# Patient Record
Sex: FEMALE | Race: WHITE
[De-identification: names, ages, dates, MRNs, and addresses within clinical notes are randomized per-mention and may not be internally consistent; named-entity substitution may affect disease eponyms.]

---

## 2021-04-22 ENCOUNTER — HOSPITAL ENCOUNTER (OUTPATIENT)
Dept: HOSPITAL 63 - MAMMO | Age: 76
End: 2021-04-22
Attending: FAMILY MEDICINE
Payer: MEDICARE

## 2021-04-22 DIAGNOSIS — Z12.31: Primary | ICD-10-CM

## 2021-04-22 PROCEDURE — 77063 BREAST TOMOSYNTHESIS BI: CPT

## 2021-04-22 PROCEDURE — 77067 SCR MAMMO BI INCL CAD: CPT

## 2021-04-27 NOTE — RAD
PROCEDURE:  MG BILAT SCREEN+MARY



HISTORY: The patient is 76 years old and is seen for Reason: SCREENING / Spl. Instructions:  / Histor
y: .



COMPARISON: May 29, 2018 and September 5, 2019 



TECHNIQUE: CC and MLO views of both breasts were obtained.  Images were processed by the [employer]
 computer-aided detection system.



DENSITY: The breast parenchyma is heterogeneously dense. This may lower the sensitivity of mammograph
y.



FINDINGS:   No developing mass, suspicious calcifications or architectural distortion.  Right inferio
r breast asymmetry on MLO view, unchanged compared to 2018.



IMPRESSION: Negative. No evidence of malignancy.



 Recommend annual screening mammograms per [employer] guidelines. She will be due in one
 year.



 



BI-RADS category 2  

Benign



Patient entered into a reminder system for annual screening mammogram.





Electronically signed by: Donald Vega DO (4/27/2021 4:31 PM) [id number]

## 2022-04-09 ENCOUNTER — HOSPITAL ENCOUNTER (EMERGENCY)
Dept: HOSPITAL 63 - ER | Age: 77
Discharge: HOME | End: 2022-04-09
Payer: MEDICARE

## 2022-04-09 VITALS — DIASTOLIC BLOOD PRESSURE: 81 MMHG | SYSTOLIC BLOOD PRESSURE: 168 MMHG

## 2022-04-09 VITALS — WEIGHT: 155.21 LBS | HEIGHT: 65 IN | BODY MASS INDEX: 25.86 KG/M2

## 2022-04-09 DIAGNOSIS — Z88.8: ICD-10-CM
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
## 2022-04-09 DIAGNOSIS — S51.811A: ICD-10-CM

## 2022-04-09 DIAGNOSIS — Z88.2: ICD-10-CM
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
## 2022-04-09 DIAGNOSIS — R51.9: ICD-10-CM
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
## 2022-04-09 DIAGNOSIS — S01.01XA: Primary | ICD-10-CM

## 2022-04-09 LAB
ALBUMIN SERPL-MCNC: 3.6 G/DL (ref 3.4–5)
ALBUMIN/GLOB SERPL: 1.2 {RATIO} (ref 1–1.7)
ALP SERPL-CCNC: 124 U/L (ref 46–116)
ALT SERPL-CCNC: 37 U/L (ref 14–59)
ANION GAP SERPL CALC-SCNC: 12 MMOL/L (ref 6–14)
AST SERPL-CCNC: 34 U/L (ref 15–37)
BASOPHILS # BLD AUTO: 0.1 X10^3/UL (ref 0–0.2)
BASOPHILS NFR BLD: 1 % (ref 0–3)
BILIRUB SERPL-MCNC: 0.8 MG/DL (ref 0.2–1)
BUN/CREAT SERPL: 18 (ref 6–20)
CA-I SERPL ISE-MCNC: 21 MG/DL (ref 7–20)
CALCIUM SERPL-MCNC: 9.2 MG/DL (ref 8.5–10.1)
CHLORIDE SERPL-SCNC: 99 MMOL/L (ref 98–107)
CO2 SERPL-SCNC: 25 MMOL/L (ref 21–32)
CREAT SERPL-MCNC: 1.2 MG/DL (ref 0.6–1)
EOSINOPHIL NFR BLD: 0.1 X10^3/UL (ref 0–0.7)
EOSINOPHIL NFR BLD: 1 % (ref 0–3)
ERYTHROCYTE [DISTWIDTH] IN BLOOD BY AUTOMATED COUNT: 13.6 % (ref 11.5–14.5)
GFR SERPLBLD BASED ON 1.73 SQ M-ARVRAT: 43.6 ML/MIN
GLOBULIN SER-MCNC: 2.9 G/DL (ref 2.2–3.8)
GLUCOSE SERPL-MCNC: 135 MG/DL (ref 70–99)
HCT VFR BLD CALC: 46.4 % (ref 36–47)
HGB BLD-MCNC: 15.6 G/DL (ref 12–15.5)
LYMPHOCYTES # BLD: 0.9 X10^3/UL (ref 1–4.8)
LYMPHOCYTES NFR BLD AUTO: 9 % (ref 24–48)
MCH RBC QN AUTO: 32 PG (ref 25–35)
MCHC RBC AUTO-ENTMCNC: 34 G/DL (ref 31–37)
MCV RBC AUTO: 95 FL (ref 79–100)
MONO #: 0.6 X10^3/UL (ref 0–1.1)
MONOCYTES NFR BLD: 6 % (ref 0–9)
NEUT #: 8.1 X10^3UL (ref 1.8–7.7)
NEUTROPHILS NFR BLD AUTO: 84 % (ref 31–73)
PLATELET # BLD AUTO: 248 X10^3/UL (ref 140–400)
POTASSIUM SERPL-SCNC: 3.5 MMOL/L (ref 3.5–5.1)
PROT SERPL-MCNC: 6.5 G/DL (ref 6.4–8.2)
RBC # BLD AUTO: 4.86 X10^6/UL (ref 3.5–5.4)
SODIUM SERPL-SCNC: 136 MMOL/L (ref 136–145)
WBC # BLD AUTO: 9.7 X10^3/UL (ref 4–11)

## 2022-04-09 PROCEDURE — 90715 TDAP VACCINE 7 YRS/> IM: CPT

## 2022-04-09 PROCEDURE — 80053 COMPREHEN METABOLIC PANEL: CPT

## 2022-04-09 PROCEDURE — 85025 COMPLETE CBC W/AUTO DIFF WBC: CPT

## 2022-04-09 PROCEDURE — 70450 CT HEAD/BRAIN W/O DYE: CPT

## 2022-04-09 PROCEDURE — 36415 COLL VENOUS BLD VENIPUNCTURE: CPT

## 2022-04-09 PROCEDURE — 99284 EMERGENCY DEPT VISIT MOD MDM: CPT

## 2022-04-09 PROCEDURE — 90471 IMMUNIZATION ADMIN: CPT

## 2022-04-09 NOTE — PHYS DOC
Past History


Additional Past Medical Histor:  lower ext edema,


Past Surgical History:  Other


Additional Past Surgical Histo:  cataract





General Adult


EDM:


Chief Complaint:  MECHANICAL FALL





HPI:


HPI:


77-year-old female presents after fall.  Patient was out working in the ditch to

Get back brush.  She was walking along and her foot stepped into a hole and she 

fell over and struck her head on the asphalt street.  No loss of consciousness. 

She had bleeding of the scalp that took a long while to stop.  She is on aspirin

daily.  She is on no other blood thinners or antiplatelets.  Patient denies 

being knocked unconscious.  Denies nausea, vomiting, altered sensation.  She 

currently has a headache and wants to make sure she does not need stitches.  

Tetanus is not up-to-date.





Review of Systems:


Review of Systems:


Constitutional:  Denies fever or chills 


Eyes:  Denies change in visual acuity 


HENT:  Denies nasal congestion or sore throat 


Respiratory:  Denies cough or shortness of breath 


Cardiovascular:  Denies chest pain or edema 


GI:  Denies abdominal pain, nausea, vomiting, bloody stools or diarrhea 


: Denies dysuria 


Musculoskeletal:  Denies back pain or joint pain 


Integument: Laceration of the scalp


Neurologic:  Headache. Denies focal weakness or sensory changes 


Endocrine:  Denies polyuria or polydipsia 


Lymphatic:  Denies swollen glands 


Psychiatric:  Denies depression or anxiety





Allergies:


Allergies:





Allergies








Coded Allergies Type Severity Reaction Last Updated Verified


 


  Sulfa (Sulfonamide Antibiotics) Allergy Unknown  4/9/22 Yes


 


  amlodipine Allergy Unknown  4/9/22 Yes


 


  lisinopril Allergy Unknown  4/9/22 Yes


 


  ranitidine Allergy Unknown  4/9/22 Yes











Physical Exam:


PE:





Constitutional: Well developed, well nourished, no acute distress, non-toxic 

appearance. []


HENT: Normocephalic, atraumatic, bilateral external ears normal, oropharynx 

moist, no oral exudates, nose normal. []


Eyes: PERRLA, EOMI, conjunctiva normal, no discharge. [] 


Neck: Normal range of motion, no tenderness, supple, no stridor. [] 


Cardiovascular: Heart rate regular rhythm, no murmur []


Lungs & Thorax:  Bilateral breath sounds clear to auscultation []


Abdomen: Bowel sounds normal, soft, no tenderness, no masses, no pulsatile 

masses. [] 


Skin: Warm, dry, 3 mm laceration of the superior posterior scalp.  1.5 cm skin 

tear of the right forearm.  [] 


Back: No tenderness, no CVA tenderness. [] 


Extremities: No tenderness, no cyanosis, no clubbing, ROM intact, no edema. [] 


Neurologic: Alert and oriented X 3, normal motor function, normal sensory 

function, no focal deficits noted. []


Psychologic: Affect normal, judgement normal, mood normal. []





Current Patient Data:


Vital Signs:





                                   Vital Signs








  Date Time  Temp Pulse Resp B/P (MAP) Pulse Ox O2 Delivery O2 Flow Rate FiO2


 


4/9/22 15:25 97.7 72 16 198/101 (133) 94 Room Air  











EKG:


EKG:


[]





Radiology/Procedures:


Radiology/Procedures:


[]


Impressions:


CT HEAD/BRAIN WO





Clinical indications: Reason: fall, head laceration





COMPARISON: None available.





Technique: Noncontrast axial cross sectional scanning of the head was performed.

 





PQRS compliance Statement





One or more of the following individualized dose reduction techniques were 

utilized for this study:


1.  Automated exposure control


2.  Adjustment of the mA and/or kV according to patient size


3.  Use of iterative reconstruction technique








Findings: No acute intracranial hemorrhage or midline shift or mass-effect or 

hydrocephalus or extra-axial fluid collection is seen. There is periventricular 

white matter hypodensity consistent with chronic small vessel ischemic disease 

in this age group. Small right upper posterior parietal soft tissue hematoma 

with soft tissue air due to a laceration injury is seen. No underlying skull 

fracture or pneumocephalus is seen. No opacification of the mastoid sinuses or 

the middle ear cavities or the paranasal sinuses is seen. The maxillary sinuses 

are not completely seen in this study.





IMPRESSION: No acute intracranial abnormality is seen.





Electronically signed by: Vesta Ricardo MD (4/9/2022 4:06 PM) [id number]














DICTATED AND SIGNED BY:     VESTA RICARDO MD


DATE:     04/09/22 1600





CC: MULUGETA MAY DO; EDNA BERMUDEZ ~





Heart Score:


C/O Chest Pain:  N/A


Risk Factors:


Risk Factors:  DM, Current or recent (<one month) smoker, HTN, HLP, family 

history of CAD, obesity.


Risk Scores:


Score 0 - 3:  2.5% MACE over next 6 weeks - Discharge Home


Score 4 - 6:  20.3% MACE over next 6 weeks - Admit for Clinical Observation


Score 7 - 10:  72.7% MACE over next 6 weeks - Early Invasive Strategies





Course & Med Decision Making:


Course & Med Decision Making


Pertinent Labs and Imaging studies reviewed. (See chart for details)


The patient's wounds do not require sutures or staples.  I will update her 

tetanus in the ED. her head CT is negative for acute findings.  Patient's labs 

are unremarkable.  She is stable for discharge at this time.


[]





Dragon Disclaimer:


Dragon Disclaimer:


This electronic medical record was generated, in whole or in part, using a voice

 recognition dictation system.





Departure


Departure:


Impression:  


   Primary Impression:  


   Fall from slip, trip, or stumble


   Additional Impression:  


   Skin tear


Disposition:  01 HOME / SELF CARE / HOMELESS


Condition:  STABLE


Referrals:  


EDNA BERMUDEZ (PCP)


Patient Instructions:  Skin Tear Care, Easy-to-Read











MULUGETA MAY DO                  Apr 9, 2022 15:40

## 2022-04-09 NOTE — RAD
CT HEAD/BRAIN WO



Clinical indications: Reason: fall, head laceration



COMPARISON: None available.



Technique: Noncontrast axial cross sectional scanning of the head was performed. 



PQRS compliance Statement



One or more of the following individualized dose reduction techniques were utilized for this study:

1.  Automated exposure control

2.  Adjustment of the mA and/or kV according to patient size

3.  Use of iterative reconstruction technique





Findings: No acute intracranial hemorrhage or midline shift or mass-effect or hydrocephalus or extra-
axial fluid collection is seen. There is periventricular white matter hypodensity consistent with chr
onic small vessel ischemic disease in this age group. Small right upper posterior parietal soft tissu
e hematoma with soft tissue air due to a laceration injury is seen. No underlying skull fracture or p
neumocephalus is seen. No opacification of the mastoid sinuses or the middle ear cavities or the para
nasal sinuses is seen. The maxillary sinuses are not completely seen in this study.



IMPRESSION: No acute intracranial abnormality is seen.



Electronically signed by: Johnny Ricardo MD (4/9/2022 4:06 PM) [id number]